# Patient Record
Sex: MALE | Race: WHITE
[De-identification: names, ages, dates, MRNs, and addresses within clinical notes are randomized per-mention and may not be internally consistent; named-entity substitution may affect disease eponyms.]

---

## 2018-04-11 ENCOUNTER — HOSPITAL ENCOUNTER (OUTPATIENT)
Dept: HOSPITAL 47 - RADCTMAIN | Age: 78
Discharge: HOME | End: 2018-04-11
Attending: INTERNAL MEDICINE
Payer: MEDICARE

## 2018-04-11 DIAGNOSIS — K57.30: Primary | ICD-10-CM

## 2018-04-11 LAB — BUN SERPL-SCNC: 22 MG/DL (ref 9–20)

## 2018-04-11 PROCEDURE — 36415 COLL VENOUS BLD VENIPUNCTURE: CPT

## 2018-04-11 PROCEDURE — 82565 ASSAY OF CREATININE: CPT

## 2018-04-11 PROCEDURE — 74177 CT ABD & PELVIS W/CONTRAST: CPT

## 2018-04-11 PROCEDURE — 84520 ASSAY OF UREA NITROGEN: CPT

## 2018-04-11 NOTE — CT
EXAMINATION TYPE: CT abdomen pelvis w con

 

DATE OF EXAM: 4/11/2018

 

COMPARISON: NONE

 

HISTORY: lower quad pain x 3 months

 

CT DLP: 1845 mGycm, Automated Exposure Control for Dose Reduction was Utilized.

 

CONTRAST: 

CT scan of the abdomen and pelvis is performed with oral and with IV Contrast, patient injected with 
100 mL of Isovue 300.

 

FINDINGS:

 

LUNG BASES: Coronary artery calcification is present which is noted marker for coronary artery diseas
e.

 

LIVER/GB:   No significant abnormality is appreciated.

 

PANCREAS:  No significant abnormality is seen.

 

SPLEEN:  No significant abnormality is seen.

 

ADRENALS:  No significant abnormality is seen.

 

KIDNEYS:  No significant abnormality is seen.

 

BOWEL: Oral contrast reaches level of rectum. There are diverticula in the sigmoid colon. There is no
 CT evidence for acute diverticulitis. Normal-appearing contrast-filled appendix is seen from base of
 cecum. There is no suspicious small or large bowel dilatation.

 

PROSTATE/SEMINAL VESICLES: Central zone calcifications are seen in a slightly enlarged prostate gland
. There are adjacent pelvic phleboliths. Underlying BPH is suspected. Bladder wall is mildly thickene
d likely product of bile duct obstruction from prostate enlargement.

 

LYMPH NODES:  No greater than 1cm abdominal or pelvic lymph nodes are appreciated.

 

OSSEOUS STRUCTURES: There is prominent facet arthropathy lower lumbar levels. There is multilevel spu
rring in the thoracolumbar spine. Spine is straightened on sagittal images. There is disc space narro
wing lower lumbar levels.

 

OTHER: There is moderate atherosclerotic change of aorta extending into branch vessels. There is ecta
yuliana of the abdominal aorta measuring up to 3.05 cm AP diameter on axial image 37.

 

IMPRESSION:  Sigmoid colonic diverticulosis without CT evidence for acute diverticulitis. No signific
ant acute finding is seen to account for patient's clinical symptoms.

## 2018-08-16 ENCOUNTER — HOSPITAL ENCOUNTER (OUTPATIENT)
Dept: HOSPITAL 47 - RADXRMAIN | Age: 78
Discharge: HOME | End: 2018-08-16
Attending: INTERNAL MEDICINE
Payer: MEDICARE

## 2018-08-16 DIAGNOSIS — M12.811: ICD-10-CM

## 2018-08-16 DIAGNOSIS — J06.9: Primary | ICD-10-CM

## 2018-08-16 PROCEDURE — 71046 X-RAY EXAM CHEST 2 VIEWS: CPT

## 2018-08-16 NOTE — XR
EXAMINATION TYPE: XR chest 2V

 

DATE OF EXAM: 8/16/2018

 

COMPARISON: NONE

 

HISTORY: Chest tightness an acute upper respiratory infection

 

TECHNIQUE:  Frontal and lateral views of the chest are obtained.

 

FINDINGS:  There is no focal air space opacity, pleural effusion, or pneumothorax seen. There is slig
ht pulmonary hyperinflation and flattening of the diaphragms suggesting underlying COPD. The cardiac 
silhouette size is within normal limits.   The osseous structures are intact. Mild multilevel degener
ative changes of the thoracic spine are noted.

 

IMPRESSION:  No acute cardiopulmonary process. No focal consolidation to suggest pneumonia. Findings 
suggesting underlying COPD.

## 2018-08-16 NOTE — XR
EXAMINATION TYPE: XR shoulder complete RT

 

DATE OF EXAM: 8/16/2018

 

CLINICAL HISTORY: Right shoulder pain for one week with no known injury

 

TECHNIQUE:  Three views of the right shoulder are obtained.

 

COMPARISON: None. 

 

FINDINGS:  There is no acute fracture/dislocation evident in the right shoulder.  The acromioclavicul
ar and glenohumeral joint spaces demonstrate joint space narrowing and small marginal osteophytes. Ax
illary arterial atherosclerosis is mild..  The visualized ribs are intact and unremarkable.

 

IMPRESSION:  There is no acute fracture or dislocation in the right shoulder. Mild to moderate glenoh
umeral and acromioclavicular arthropathy.